# Patient Record
Sex: FEMALE | Race: WHITE | NOT HISPANIC OR LATINO | Employment: UNEMPLOYED | URBAN - METROPOLITAN AREA
[De-identification: names, ages, dates, MRNs, and addresses within clinical notes are randomized per-mention and may not be internally consistent; named-entity substitution may affect disease eponyms.]

---

## 2017-01-03 ENCOUNTER — TRANSCRIBE ORDERS (OUTPATIENT)
Dept: ADMINISTRATIVE | Facility: HOSPITAL | Age: 82
End: 2017-01-03

## 2017-01-03 ENCOUNTER — HOSPITAL ENCOUNTER (OUTPATIENT)
Dept: RADIOLOGY | Facility: HOSPITAL | Age: 82
Discharge: HOME | End: 2017-01-03
Attending: INTERNAL MEDICINE
Payer: MEDICARE

## 2017-01-03 DIAGNOSIS — R52 PAIN: Primary | ICD-10-CM

## 2017-01-03 DIAGNOSIS — M54.5 LOW BACK PAIN, UNSPECIFIED BACK PAIN LATERALITY, UNSPECIFIED CHRONICITY, WITH SCIATICA PRESENCE UNSPECIFIED: ICD-10-CM

## 2017-01-03 PROCEDURE — 72100 X-RAY EXAM L-S SPINE 2/3 VWS: CPT

## 2017-01-03 PROCEDURE — 73502 X-RAY EXAM HIP UNI 2-3 VIEWS: CPT

## 2017-01-03 PROCEDURE — 72220 X-RAY EXAM SACRUM TAILBONE: CPT

## 2017-03-03 ENCOUNTER — HOSPITAL ENCOUNTER (INPATIENT)
Facility: HOSPITAL | Age: 82
LOS: 4 days | Discharge: NURSING, REHAB OR LONG-TERM CARE | DRG: 605 | End: 2017-03-07
Attending: EMERGENCY MEDICINE | Admitting: INTERNAL MEDICINE
Payer: MEDICARE

## 2017-03-03 ENCOUNTER — APPOINTMENT (EMERGENCY)
Dept: RADIOLOGY | Facility: HOSPITAL | Age: 82
DRG: 605 | End: 2017-03-03
Payer: MEDICARE

## 2017-03-03 DIAGNOSIS — S80.00XA KNEE CONTUSION: Primary | ICD-10-CM

## 2017-03-03 DIAGNOSIS — R26.2 AMBULATORY DYSFUNCTION: ICD-10-CM

## 2017-03-03 PROBLEM — E87.6 HYPOKALEMIA: Status: ACTIVE | Noted: 2017-03-03

## 2017-03-03 PROBLEM — E87.0 HYPERNATREMIA: Status: ACTIVE | Noted: 2017-03-03

## 2017-03-03 PROBLEM — T14.8XXA HEMATOMA: Status: ACTIVE | Noted: 2017-03-03

## 2017-03-03 PROBLEM — M25.561 RIGHT KNEE PAIN: Status: ACTIVE | Noted: 2017-03-03

## 2017-03-03 LAB
ALBUMIN SERPL BCP-MCNC: 3.8 G/DL (ref 3.5–5)
ALP SERPL-CCNC: 124 U/L (ref 46–116)
ALT SERPL W P-5'-P-CCNC: 30 U/L (ref 12–78)
ANION GAP SERPL CALCULATED.3IONS-SCNC: 11 MMOL/L (ref 4–13)
APTT PPP: 21 SECONDS (ref 24–33)
AST SERPL W P-5'-P-CCNC: 20 U/L (ref 5–45)
BASOPHILS # BLD AUTO: 0 THOUSANDS/ΜL (ref 0–0.1)
BASOPHILS NFR BLD AUTO: 0 % (ref 0–1)
BILIRUB SERPL-MCNC: 0.6 MG/DL (ref 0.2–1)
BUN SERPL-MCNC: 15 MG/DL (ref 5–25)
CALCIUM SERPL-MCNC: 8.7 MG/DL (ref 8.3–10.1)
CHLORIDE SERPL-SCNC: 107 MMOL/L (ref 100–108)
CO2 SERPL-SCNC: 29 MMOL/L (ref 21–32)
CREAT SERPL-MCNC: 0.69 MG/DL (ref 0.6–1.3)
EOSINOPHIL # BLD AUTO: 0.1 THOUSAND/ΜL (ref 0–0.61)
EOSINOPHIL NFR BLD AUTO: 1 % (ref 0–6)
ERYTHROCYTE [DISTWIDTH] IN BLOOD BY AUTOMATED COUNT: 14.8 % (ref 11.6–15.1)
GFR SERPL CREATININE-BSD FRML MDRD: >60 ML/MIN/1.73SQ M
GLUCOSE SERPL-MCNC: 85 MG/DL (ref 65–140)
HCT VFR BLD AUTO: 41.1 % (ref 37–47)
HGB BLD-MCNC: 13.6 G/DL (ref 12–16)
INR PPP: 1.03 (ref 0.86–1.16)
LYMPHOCYTES # BLD AUTO: 1.3 THOUSANDS/ΜL (ref 0.6–4.47)
LYMPHOCYTES NFR BLD AUTO: 22 % (ref 14–44)
MCH RBC QN AUTO: 31.5 PG (ref 27–31)
MCHC RBC AUTO-ENTMCNC: 33.2 G/DL (ref 31.4–37.4)
MCV RBC AUTO: 95 FL (ref 82–98)
MONOCYTES # BLD AUTO: 0.4 THOUSAND/ΜL (ref 0.17–1.22)
MONOCYTES NFR BLD AUTO: 6 % (ref 4–12)
NEUTROPHILS # BLD AUTO: 4.1 THOUSANDS/ΜL (ref 1.85–7.62)
NEUTS SEG NFR BLD AUTO: 71 % (ref 43–75)
NRBC BLD AUTO-RTO: 0 /100 WBCS
PLATELET # BLD AUTO: 150 THOUSANDS/UL (ref 130–400)
PMV BLD AUTO: 8.6 FL (ref 8.9–12.7)
POTASSIUM SERPL-SCNC: 3.4 MMOL/L (ref 3.5–5.3)
PROT SERPL-MCNC: 6.6 G/DL (ref 6.4–8.2)
PROTHROMBIN TIME: 10.8 SECONDS (ref 9.4–11.7)
RBC # BLD AUTO: 4.33 MILLION/UL (ref 4.2–5.4)
SODIUM SERPL-SCNC: 147 MMOL/L (ref 136–145)
WBC # BLD AUTO: 5.8 THOUSAND/UL (ref 4.8–10.8)

## 2017-03-03 PROCEDURE — 99285 EMERGENCY DEPT VISIT HI MDM: CPT

## 2017-03-03 PROCEDURE — 80053 COMPREHEN METABOLIC PANEL: CPT | Performed by: EMERGENCY MEDICINE

## 2017-03-03 PROCEDURE — 36415 COLL VENOUS BLD VENIPUNCTURE: CPT | Performed by: EMERGENCY MEDICINE

## 2017-03-03 PROCEDURE — 85610 PROTHROMBIN TIME: CPT | Performed by: EMERGENCY MEDICINE

## 2017-03-03 PROCEDURE — 85025 COMPLETE CBC W/AUTO DIFF WBC: CPT | Performed by: EMERGENCY MEDICINE

## 2017-03-03 PROCEDURE — 96374 THER/PROPH/DIAG INJ IV PUSH: CPT

## 2017-03-03 PROCEDURE — 73560 X-RAY EXAM OF KNEE 1 OR 2: CPT

## 2017-03-03 PROCEDURE — 70450 CT HEAD/BRAIN W/O DYE: CPT

## 2017-03-03 PROCEDURE — 85730 THROMBOPLASTIN TIME PARTIAL: CPT | Performed by: EMERGENCY MEDICINE

## 2017-03-03 PROCEDURE — 96375 TX/PRO/DX INJ NEW DRUG ADDON: CPT

## 2017-03-03 RX ORDER — ACETAMINOPHEN 325 MG/1
650 TABLET ORAL EVERY 6 HOURS PRN
Status: DISCONTINUED | OUTPATIENT
Start: 2017-03-03 | End: 2017-03-08 | Stop reason: HOSPADM

## 2017-03-03 RX ORDER — MORPHINE SULFATE 2 MG/ML
2 INJECTION, SOLUTION INTRAMUSCULAR; INTRAVENOUS ONCE
Status: COMPLETED | OUTPATIENT
Start: 2017-03-03 | End: 2017-03-03

## 2017-03-03 RX ORDER — LISINOPRIL 10 MG/1
10 TABLET ORAL DAILY
Status: DISCONTINUED | OUTPATIENT
Start: 2017-03-04 | End: 2017-03-04

## 2017-03-03 RX ORDER — ATORVASTATIN CALCIUM 40 MG/1
40 TABLET, FILM COATED ORAL
Status: DISCONTINUED | OUTPATIENT
Start: 2017-03-03 | End: 2017-03-08 | Stop reason: HOSPADM

## 2017-03-03 RX ORDER — DEXTROSE AND POTASSIUM CHLORIDE 5; .15 G/100ML; G/100ML
50 SOLUTION INTRAVENOUS CONTINUOUS
Status: DISCONTINUED | OUTPATIENT
Start: 2017-03-03 | End: 2017-03-04

## 2017-03-03 RX ORDER — ONDANSETRON 2 MG/ML
4 INJECTION INTRAMUSCULAR; INTRAVENOUS ONCE
Status: COMPLETED | OUTPATIENT
Start: 2017-03-03 | End: 2017-03-03

## 2017-03-03 RX ORDER — MORPHINE SULFATE 4 MG/ML
4 INJECTION, SOLUTION INTRAMUSCULAR; INTRAVENOUS ONCE
Status: DISCONTINUED | OUTPATIENT
Start: 2017-03-03 | End: 2017-03-03

## 2017-03-03 RX ORDER — MORPHINE SULFATE 2 MG/ML
2 INJECTION, SOLUTION INTRAMUSCULAR; INTRAVENOUS EVERY 4 HOURS PRN
Status: DISCONTINUED | OUTPATIENT
Start: 2017-03-03 | End: 2017-03-08 | Stop reason: HOSPADM

## 2017-03-03 RX ORDER — SIMVASTATIN 80 MG
80 TABLET ORAL
COMMUNITY

## 2017-03-03 RX ORDER — POLYETHYLENE GLYCOL 3350 17 G/17G
17 POWDER, FOR SOLUTION ORAL EVERY OTHER DAY
Status: DISCONTINUED | OUTPATIENT
Start: 2017-03-03 | End: 2017-03-03

## 2017-03-03 RX ORDER — POLYETHYLENE GLYCOL 3350 17 G/17G
17 POWDER, FOR SOLUTION ORAL EVERY OTHER DAY
Status: DISCONTINUED | OUTPATIENT
Start: 2017-03-03 | End: 2017-03-08 | Stop reason: HOSPADM

## 2017-03-03 RX ORDER — PHENOBARBITAL 32.4 MG/1
32.4 TABLET ORAL
Status: DISCONTINUED | OUTPATIENT
Start: 2017-03-04 | End: 2017-03-08 | Stop reason: HOSPADM

## 2017-03-03 RX ORDER — ONDANSETRON 2 MG/ML
4 INJECTION INTRAMUSCULAR; INTRAVENOUS EVERY 6 HOURS PRN
Status: DISCONTINUED | OUTPATIENT
Start: 2017-03-03 | End: 2017-03-08 | Stop reason: HOSPADM

## 2017-03-03 RX ORDER — PHENOBARBITAL 32.4 MG/1
64.8 TABLET ORAL
Status: DISCONTINUED | OUTPATIENT
Start: 2017-03-03 | End: 2017-03-08 | Stop reason: HOSPADM

## 2017-03-03 RX ADMIN — METOPROLOL TARTRATE 25 MG: 25 TABLET ORAL at 20:47

## 2017-03-03 RX ADMIN — POLYETHYLENE GLYCOL 3350 17 G: 17 POWDER, FOR SOLUTION ORAL at 20:47

## 2017-03-03 RX ADMIN — DEXTROSE AND POTASSIUM CHLORIDE 50 ML/HR: 5; .15 SOLUTION INTRAVENOUS at 20:47

## 2017-03-03 RX ADMIN — ACETAMINOPHEN 650 MG: 325 TABLET, FILM COATED ORAL at 18:24

## 2017-03-03 RX ADMIN — ATORVASTATIN CALCIUM 40 MG: 40 TABLET, FILM COATED ORAL at 20:47

## 2017-03-03 RX ADMIN — ONDANSETRON 4 MG: 2 INJECTION INTRAMUSCULAR; INTRAVENOUS at 13:17

## 2017-03-03 RX ADMIN — MORPHINE SULFATE 2 MG: 2 INJECTION, SOLUTION INTRAMUSCULAR; INTRAVENOUS at 13:17

## 2017-03-03 RX ADMIN — PHENOBARBITAL 64.8 MG: 32.4 TABLET ORAL at 21:05

## 2017-03-04 ENCOUNTER — APPOINTMENT (INPATIENT)
Dept: PHYSICAL THERAPY | Facility: HOSPITAL | Age: 82
DRG: 605 | End: 2017-03-04
Payer: MEDICARE

## 2017-03-04 LAB
ALBUMIN SERPL BCP-MCNC: 2.7 G/DL (ref 3.5–5)
ALP SERPL-CCNC: 88 U/L (ref 46–116)
ALT SERPL W P-5'-P-CCNC: 22 U/L (ref 12–78)
ANION GAP SERPL CALCULATED.3IONS-SCNC: 5 MMOL/L (ref 4–13)
AST SERPL W P-5'-P-CCNC: 15 U/L (ref 5–45)
BASOPHILS # BLD AUTO: 0 THOUSANDS/ΜL (ref 0–0.1)
BASOPHILS NFR BLD AUTO: 0 % (ref 0–1)
BILIRUB SERPL-MCNC: 0.4 MG/DL (ref 0.2–1)
BUN SERPL-MCNC: 20 MG/DL (ref 5–25)
CALCIUM SERPL-MCNC: 7.4 MG/DL (ref 8.3–10.1)
CHLORIDE SERPL-SCNC: 107 MMOL/L (ref 100–108)
CK SERPL-CCNC: 35 U/L (ref 26–192)
CO2 SERPL-SCNC: 30 MMOL/L (ref 21–32)
CREAT SERPL-MCNC: 0.79 MG/DL (ref 0.6–1.3)
EOSINOPHIL # BLD AUTO: 0.1 THOUSAND/ΜL (ref 0–0.61)
EOSINOPHIL NFR BLD AUTO: 1 % (ref 0–6)
ERYTHROCYTE [DISTWIDTH] IN BLOOD BY AUTOMATED COUNT: 14.4 % (ref 11.6–15.1)
GFR SERPL CREATININE-BSD FRML MDRD: >60 ML/MIN/1.73SQ M
GLUCOSE SERPL-MCNC: 112 MG/DL (ref 65–140)
HCT VFR BLD AUTO: 28.7 % (ref 37–47)
HGB BLD-MCNC: 9.5 G/DL (ref 12–16)
LACTATE SERPL-SCNC: 1.2 MMOL/L (ref 0.5–2)
LYMPHOCYTES # BLD AUTO: 2.2 THOUSANDS/ΜL (ref 0.6–4.47)
LYMPHOCYTES NFR BLD AUTO: 30 % (ref 14–44)
MAGNESIUM SERPL-MCNC: 1.9 MG/DL (ref 1.6–2.6)
MCH RBC QN AUTO: 31.3 PG (ref 27–31)
MCHC RBC AUTO-ENTMCNC: 33 G/DL (ref 31.4–37.4)
MCV RBC AUTO: 95 FL (ref 82–98)
MONOCYTES # BLD AUTO: 0.6 THOUSAND/ΜL (ref 0.17–1.22)
MONOCYTES NFR BLD AUTO: 8 % (ref 4–12)
NEUTROPHILS # BLD AUTO: 4.6 THOUSANDS/ΜL (ref 1.85–7.62)
NEUTS SEG NFR BLD AUTO: 62 % (ref 43–75)
NRBC BLD AUTO-RTO: 0 /100 WBCS
PHOSPHATE SERPL-MCNC: 3.6 MG/DL (ref 2.3–4.1)
PLATELET # BLD AUTO: 134 THOUSANDS/UL (ref 130–400)
PMV BLD AUTO: 9 FL (ref 8.9–12.7)
POTASSIUM SERPL-SCNC: 3.7 MMOL/L (ref 3.5–5.3)
PROT SERPL-MCNC: 4.8 G/DL (ref 6.4–8.2)
RBC # BLD AUTO: 3.02 MILLION/UL (ref 4.2–5.4)
SODIUM SERPL-SCNC: 142 MMOL/L (ref 136–145)
WBC # BLD AUTO: 7.4 THOUSAND/UL (ref 4.8–10.8)

## 2017-03-04 PROCEDURE — G8978 MOBILITY CURRENT STATUS: HCPCS

## 2017-03-04 PROCEDURE — 97110 THERAPEUTIC EXERCISES: CPT

## 2017-03-04 PROCEDURE — 97162 PT EVAL MOD COMPLEX 30 MIN: CPT

## 2017-03-04 PROCEDURE — 83735 ASSAY OF MAGNESIUM: CPT | Performed by: INTERNAL MEDICINE

## 2017-03-04 PROCEDURE — G8979 MOBILITY GOAL STATUS: HCPCS

## 2017-03-04 PROCEDURE — 97166 OT EVAL MOD COMPLEX 45 MIN: CPT

## 2017-03-04 PROCEDURE — G8987 SELF CARE CURRENT STATUS: HCPCS

## 2017-03-04 PROCEDURE — G8988 SELF CARE GOAL STATUS: HCPCS

## 2017-03-04 PROCEDURE — 84100 ASSAY OF PHOSPHORUS: CPT | Performed by: INTERNAL MEDICINE

## 2017-03-04 PROCEDURE — 83605 ASSAY OF LACTIC ACID: CPT | Performed by: INTERNAL MEDICINE

## 2017-03-04 PROCEDURE — 82550 ASSAY OF CK (CPK): CPT | Performed by: INTERNAL MEDICINE

## 2017-03-04 PROCEDURE — 85025 COMPLETE CBC W/AUTO DIFF WBC: CPT | Performed by: INTERNAL MEDICINE

## 2017-03-04 PROCEDURE — 80053 COMPREHEN METABOLIC PANEL: CPT | Performed by: INTERNAL MEDICINE

## 2017-03-04 RX ORDER — SODIUM CHLORIDE 450 MG/100ML
50 INJECTION, SOLUTION INTRAVENOUS CONTINUOUS
Status: DISCONTINUED | OUTPATIENT
Start: 2017-03-04 | End: 2017-03-05

## 2017-03-04 RX ADMIN — PHENOBARBITAL 64.8 MG: 32.4 TABLET ORAL at 23:12

## 2017-03-04 RX ADMIN — SODIUM CHLORIDE 50 ML/HR: 0.45 INJECTION, SOLUTION INTRAVENOUS at 12:31

## 2017-03-04 RX ADMIN — ATORVASTATIN CALCIUM 40 MG: 40 TABLET, FILM COATED ORAL at 18:47

## 2017-03-04 RX ADMIN — METOPROLOL TARTRATE 25 MG: 25 TABLET ORAL at 09:08

## 2017-03-04 RX ADMIN — METOPROLOL TARTRATE 25 MG: 25 TABLET ORAL at 23:11

## 2017-03-04 RX ADMIN — Medication 1 TABLET: at 09:08

## 2017-03-04 RX ADMIN — PHENOBARBITAL 32.4 MG: 32.4 TABLET ORAL at 05:28

## 2017-03-05 ENCOUNTER — APPOINTMENT (INPATIENT)
Dept: PHYSICAL THERAPY | Facility: HOSPITAL | Age: 82
DRG: 605 | End: 2017-03-05
Payer: MEDICARE

## 2017-03-05 LAB
ANION GAP SERPL CALCULATED.3IONS-SCNC: 5 MMOL/L (ref 4–13)
BASOPHILS # BLD AUTO: 0 THOUSANDS/ΜL (ref 0–0.1)
BASOPHILS NFR BLD AUTO: 0 % (ref 0–1)
BUN SERPL-MCNC: 22 MG/DL (ref 5–25)
CALCIUM SERPL-MCNC: 7.4 MG/DL (ref 8.3–10.1)
CHLORIDE SERPL-SCNC: 108 MMOL/L (ref 100–108)
CO2 SERPL-SCNC: 29 MMOL/L (ref 21–32)
CREAT SERPL-MCNC: 0.78 MG/DL (ref 0.6–1.3)
EOSINOPHIL # BLD AUTO: 0.1 THOUSAND/ΜL (ref 0–0.61)
EOSINOPHIL NFR BLD AUTO: 2 % (ref 0–6)
ERYTHROCYTE [DISTWIDTH] IN BLOOD BY AUTOMATED COUNT: 14.9 % (ref 11.6–15.1)
GFR SERPL CREATININE-BSD FRML MDRD: >60 ML/MIN/1.73SQ M
GLUCOSE SERPL-MCNC: 88 MG/DL (ref 65–140)
HCT VFR BLD AUTO: 28.5 % (ref 37–47)
HGB BLD-MCNC: 9.3 G/DL (ref 12–16)
LYMPHOCYTES # BLD AUTO: 1.7 THOUSANDS/ΜL (ref 0.6–4.47)
LYMPHOCYTES NFR BLD AUTO: 26 % (ref 14–44)
MAGNESIUM SERPL-MCNC: 2 MG/DL (ref 1.6–2.6)
MCH RBC QN AUTO: 31.3 PG (ref 27–31)
MCHC RBC AUTO-ENTMCNC: 32.8 G/DL (ref 31.4–37.4)
MCV RBC AUTO: 95 FL (ref 82–98)
MONOCYTES # BLD AUTO: 0.4 THOUSAND/ΜL (ref 0.17–1.22)
MONOCYTES NFR BLD AUTO: 6 % (ref 4–12)
NEUTROPHILS # BLD AUTO: 4.4 THOUSANDS/ΜL (ref 1.85–7.62)
NEUTS SEG NFR BLD AUTO: 66 % (ref 43–75)
NRBC BLD AUTO-RTO: 0 /100 WBCS
PLATELET # BLD AUTO: 119 THOUSANDS/UL (ref 130–400)
PMV BLD AUTO: 8.8 FL (ref 8.9–12.7)
POTASSIUM SERPL-SCNC: 4 MMOL/L (ref 3.5–5.3)
RBC # BLD AUTO: 2.99 MILLION/UL (ref 4.2–5.4)
SODIUM SERPL-SCNC: 142 MMOL/L (ref 136–145)
WBC # BLD AUTO: 6.7 THOUSAND/UL (ref 4.8–10.8)

## 2017-03-05 PROCEDURE — 83735 ASSAY OF MAGNESIUM: CPT | Performed by: INTERNAL MEDICINE

## 2017-03-05 PROCEDURE — 97110 THERAPEUTIC EXERCISES: CPT

## 2017-03-05 PROCEDURE — 85025 COMPLETE CBC W/AUTO DIFF WBC: CPT | Performed by: INTERNAL MEDICINE

## 2017-03-05 PROCEDURE — 80048 BASIC METABOLIC PNL TOTAL CA: CPT | Performed by: INTERNAL MEDICINE

## 2017-03-05 RX ADMIN — PHENOBARBITAL 64.8 MG: 32.4 TABLET ORAL at 21:52

## 2017-03-05 RX ADMIN — METOPROLOL TARTRATE 25 MG: 25 TABLET ORAL at 08:07

## 2017-03-05 RX ADMIN — Medication 1 TABLET: at 08:11

## 2017-03-05 RX ADMIN — SODIUM CHLORIDE 50 ML/HR: 0.45 INJECTION, SOLUTION INTRAVENOUS at 08:18

## 2017-03-05 RX ADMIN — ATORVASTATIN CALCIUM 40 MG: 40 TABLET, FILM COATED ORAL at 18:44

## 2017-03-05 RX ADMIN — PHENOBARBITAL 32.4 MG: 32.4 TABLET ORAL at 06:27

## 2017-03-05 RX ADMIN — POLYETHYLENE GLYCOL 3350 17 G: 17 POWDER, FOR SOLUTION ORAL at 08:11

## 2017-03-06 ENCOUNTER — APPOINTMENT (INPATIENT)
Dept: OCCUPATIONAL THERAPY | Facility: HOSPITAL | Age: 82
DRG: 605 | End: 2017-03-06
Payer: MEDICARE

## 2017-03-06 ENCOUNTER — APPOINTMENT (INPATIENT)
Dept: PHYSICAL THERAPY | Facility: HOSPITAL | Age: 82
DRG: 605 | End: 2017-03-06
Payer: MEDICARE

## 2017-03-06 LAB
ANION GAP SERPL CALCULATED.3IONS-SCNC: 5 MMOL/L (ref 4–13)
BASOPHILS # BLD AUTO: 0 THOUSANDS/ΜL (ref 0–0.1)
BASOPHILS NFR BLD AUTO: 1 % (ref 0–1)
BUN SERPL-MCNC: 19 MG/DL (ref 5–25)
CALCIUM SERPL-MCNC: 7.6 MG/DL (ref 8.3–10.1)
CHLORIDE SERPL-SCNC: 108 MMOL/L (ref 100–108)
CO2 SERPL-SCNC: 31 MMOL/L (ref 21–32)
CREAT SERPL-MCNC: 0.69 MG/DL (ref 0.6–1.3)
EOSINOPHIL # BLD AUTO: 0.2 THOUSAND/ΜL (ref 0–0.61)
EOSINOPHIL NFR BLD AUTO: 4 % (ref 0–6)
ERYTHROCYTE [DISTWIDTH] IN BLOOD BY AUTOMATED COUNT: 14.8 % (ref 11.6–15.1)
GFR SERPL CREATININE-BSD FRML MDRD: >60 ML/MIN/1.73SQ M
GLUCOSE SERPL-MCNC: 91 MG/DL (ref 65–140)
HCT VFR BLD AUTO: 25.6 % (ref 37–47)
HGB BLD-MCNC: 8.5 G/DL (ref 12–16)
LYMPHOCYTES # BLD AUTO: 1.8 THOUSANDS/ΜL (ref 0.6–4.47)
LYMPHOCYTES NFR BLD AUTO: 31 % (ref 14–44)
MCH RBC QN AUTO: 31.5 PG (ref 27–31)
MCHC RBC AUTO-ENTMCNC: 33.3 G/DL (ref 31.4–37.4)
MCV RBC AUTO: 95 FL (ref 82–98)
MONOCYTES # BLD AUTO: 0.5 THOUSAND/ΜL (ref 0.17–1.22)
MONOCYTES NFR BLD AUTO: 9 % (ref 4–12)
NEUTROPHILS # BLD AUTO: 3.2 THOUSANDS/ΜL (ref 1.85–7.62)
NEUTS SEG NFR BLD AUTO: 56 % (ref 43–75)
NRBC BLD AUTO-RTO: 0 /100 WBCS
PLATELET # BLD AUTO: 119 THOUSANDS/UL (ref 130–400)
PMV BLD AUTO: 8.8 FL (ref 8.9–12.7)
POTASSIUM SERPL-SCNC: 3.8 MMOL/L (ref 3.5–5.3)
RBC # BLD AUTO: 2.7 MILLION/UL (ref 4.2–5.4)
SODIUM SERPL-SCNC: 144 MMOL/L (ref 136–145)
WBC # BLD AUTO: 5.7 THOUSAND/UL (ref 4.8–10.8)

## 2017-03-06 PROCEDURE — 80048 BASIC METABOLIC PNL TOTAL CA: CPT | Performed by: INTERNAL MEDICINE

## 2017-03-06 PROCEDURE — 85025 COMPLETE CBC W/AUTO DIFF WBC: CPT | Performed by: INTERNAL MEDICINE

## 2017-03-06 PROCEDURE — 97535 SELF CARE MNGMENT TRAINING: CPT

## 2017-03-06 PROCEDURE — 97110 THERAPEUTIC EXERCISES: CPT

## 2017-03-06 RX ADMIN — METOPROLOL TARTRATE 12.5 MG: 25 TABLET ORAL at 16:36

## 2017-03-06 RX ADMIN — PHENOBARBITAL 32.4 MG: 32.4 TABLET ORAL at 06:06

## 2017-03-06 RX ADMIN — PHENOBARBITAL 64.8 MG: 32.4 TABLET ORAL at 22:39

## 2017-03-06 RX ADMIN — ATORVASTATIN CALCIUM 40 MG: 40 TABLET, FILM COATED ORAL at 16:28

## 2017-03-06 RX ADMIN — METOPROLOL TARTRATE 25 MG: 25 TABLET ORAL at 08:18

## 2017-03-06 RX ADMIN — Medication 1 TABLET: at 08:18

## 2017-03-07 ENCOUNTER — APPOINTMENT (INPATIENT)
Dept: PHYSICAL THERAPY | Facility: HOSPITAL | Age: 82
DRG: 605 | End: 2017-03-07
Payer: MEDICARE

## 2017-03-07 ENCOUNTER — APPOINTMENT (INPATIENT)
Dept: OCCUPATIONAL THERAPY | Facility: HOSPITAL | Age: 82
DRG: 605 | End: 2017-03-07
Payer: MEDICARE

## 2017-03-07 VITALS
HEIGHT: 64 IN | BODY MASS INDEX: 23.05 KG/M2 | DIASTOLIC BLOOD PRESSURE: 65 MMHG | SYSTOLIC BLOOD PRESSURE: 128 MMHG | OXYGEN SATURATION: 100 % | TEMPERATURE: 98.2 F | HEART RATE: 74 BPM | RESPIRATION RATE: 18 BRPM | WEIGHT: 135 LBS

## 2017-03-07 LAB
ANION GAP SERPL CALCULATED.3IONS-SCNC: 4 MMOL/L (ref 4–13)
BUN SERPL-MCNC: 19 MG/DL (ref 5–25)
CALCIUM SERPL-MCNC: 7.9 MG/DL (ref 8.3–10.1)
CHLORIDE SERPL-SCNC: 109 MMOL/L (ref 100–108)
CO2 SERPL-SCNC: 31 MMOL/L (ref 21–32)
CREAT SERPL-MCNC: 0.57 MG/DL (ref 0.6–1.3)
ERYTHROCYTE [DISTWIDTH] IN BLOOD BY AUTOMATED COUNT: 14.9 % (ref 11.6–15.1)
GFR SERPL CREATININE-BSD FRML MDRD: >60 ML/MIN/1.73SQ M
GLUCOSE SERPL-MCNC: 96 MG/DL (ref 65–140)
HCT VFR BLD AUTO: 27.2 % (ref 37–47)
HGB BLD-MCNC: 9 G/DL (ref 12–16)
MCH RBC QN AUTO: 31.9 PG (ref 27–31)
MCHC RBC AUTO-ENTMCNC: 33.1 G/DL (ref 31.4–37.4)
MCV RBC AUTO: 96 FL (ref 82–98)
PLATELET # BLD AUTO: 136 THOUSANDS/UL (ref 130–400)
PMV BLD AUTO: 9.1 FL (ref 8.9–12.7)
POTASSIUM SERPL-SCNC: 3.7 MMOL/L (ref 3.5–5.3)
RBC # BLD AUTO: 2.82 MILLION/UL (ref 4.2–5.4)
SODIUM SERPL-SCNC: 144 MMOL/L (ref 136–145)
WBC # BLD AUTO: 6.9 THOUSAND/UL (ref 4.8–10.8)

## 2017-03-07 PROCEDURE — 80048 BASIC METABOLIC PNL TOTAL CA: CPT | Performed by: NURSE PRACTITIONER

## 2017-03-07 PROCEDURE — 97110 THERAPEUTIC EXERCISES: CPT

## 2017-03-07 PROCEDURE — 97535 SELF CARE MNGMENT TRAINING: CPT

## 2017-03-07 PROCEDURE — 85027 COMPLETE CBC AUTOMATED: CPT | Performed by: NURSE PRACTITIONER

## 2017-03-07 RX ORDER — PHENOBARBITAL 32.4 MG/1
TABLET ORAL
Status: COMPLETED
Start: 2017-03-07 | End: 2017-03-07

## 2017-03-07 RX ORDER — ACETAMINOPHEN 325 MG/1
TABLET ORAL
Status: COMPLETED
Start: 2017-03-07 | End: 2017-03-07

## 2017-03-07 RX ADMIN — METOPROLOL TARTRATE 12.5 MG: 25 TABLET ORAL at 09:30

## 2017-03-07 RX ADMIN — METOPROLOL TARTRATE 12.5 MG: 25 TABLET ORAL at 16:08

## 2017-03-07 RX ADMIN — ACETAMINOPHEN 650 MG: 325 TABLET, FILM COATED ORAL at 06:02

## 2017-03-07 RX ADMIN — PHENOBARBITAL 32.4 MG: 32.4 TABLET ORAL at 05:25

## 2017-03-07 RX ADMIN — METOPROLOL TARTRATE 12.5 MG: 25 TABLET ORAL at 00:25

## 2017-03-07 RX ADMIN — ATORVASTATIN CALCIUM 40 MG: 40 TABLET, FILM COATED ORAL at 16:08

## 2017-03-07 RX ADMIN — Medication 1 TABLET: at 09:30

## 2017-03-07 RX ADMIN — POLYETHYLENE GLYCOL 3350 17 G: 17 POWDER, FOR SOLUTION ORAL at 09:30

## 2017-07-27 ENCOUNTER — APPOINTMENT (OUTPATIENT)
Dept: PHYSICAL THERAPY | Facility: CLINIC | Age: 82
End: 2017-07-27
Payer: MEDICARE

## 2017-07-27 PROCEDURE — G8978 MOBILITY CURRENT STATUS: HCPCS

## 2017-07-27 PROCEDURE — 97163 PT EVAL HIGH COMPLEX 45 MIN: CPT

## 2017-07-27 PROCEDURE — G8979 MOBILITY GOAL STATUS: HCPCS

## 2017-08-01 ENCOUNTER — APPOINTMENT (OUTPATIENT)
Dept: PHYSICAL THERAPY | Facility: CLINIC | Age: 82
End: 2017-08-01
Payer: MEDICARE

## 2017-08-01 PROCEDURE — 97110 THERAPEUTIC EXERCISES: CPT

## 2017-08-01 PROCEDURE — 97112 NEUROMUSCULAR REEDUCATION: CPT

## 2017-08-08 ENCOUNTER — APPOINTMENT (OUTPATIENT)
Dept: PHYSICAL THERAPY | Facility: CLINIC | Age: 82
End: 2017-08-08
Payer: MEDICARE

## 2017-08-08 PROCEDURE — 97112 NEUROMUSCULAR REEDUCATION: CPT

## 2017-08-10 ENCOUNTER — APPOINTMENT (OUTPATIENT)
Dept: PHYSICAL THERAPY | Facility: CLINIC | Age: 82
End: 2017-08-10
Payer: MEDICARE

## 2017-08-10 PROCEDURE — 97112 NEUROMUSCULAR REEDUCATION: CPT

## 2017-08-15 ENCOUNTER — APPOINTMENT (OUTPATIENT)
Dept: PHYSICAL THERAPY | Facility: CLINIC | Age: 82
End: 2017-08-15
Payer: MEDICARE

## 2017-08-15 PROCEDURE — 97110 THERAPEUTIC EXERCISES: CPT

## 2017-08-15 PROCEDURE — 97112 NEUROMUSCULAR REEDUCATION: CPT

## 2017-08-22 ENCOUNTER — APPOINTMENT (OUTPATIENT)
Dept: PHYSICAL THERAPY | Facility: CLINIC | Age: 82
End: 2017-08-22
Payer: MEDICARE

## 2017-08-22 PROCEDURE — 97110 THERAPEUTIC EXERCISES: CPT

## 2017-08-22 PROCEDURE — G8979 MOBILITY GOAL STATUS: HCPCS

## 2017-08-22 PROCEDURE — 97112 NEUROMUSCULAR REEDUCATION: CPT

## 2017-08-22 PROCEDURE — G8978 MOBILITY CURRENT STATUS: HCPCS

## 2017-08-24 ENCOUNTER — APPOINTMENT (OUTPATIENT)
Dept: PHYSICAL THERAPY | Facility: CLINIC | Age: 82
End: 2017-08-24
Payer: MEDICARE

## 2017-08-24 PROCEDURE — 97110 THERAPEUTIC EXERCISES: CPT

## 2017-08-24 PROCEDURE — 97112 NEUROMUSCULAR REEDUCATION: CPT

## 2017-08-29 ENCOUNTER — APPOINTMENT (OUTPATIENT)
Dept: PHYSICAL THERAPY | Facility: CLINIC | Age: 82
End: 2017-08-29
Payer: MEDICARE

## 2017-08-29 PROCEDURE — 97112 NEUROMUSCULAR REEDUCATION: CPT

## 2017-08-29 PROCEDURE — 97110 THERAPEUTIC EXERCISES: CPT

## 2017-09-12 ENCOUNTER — APPOINTMENT (OUTPATIENT)
Dept: PHYSICAL THERAPY | Facility: CLINIC | Age: 82
End: 2017-09-12
Payer: MEDICARE

## 2017-09-12 PROCEDURE — 97110 THERAPEUTIC EXERCISES: CPT

## 2017-09-12 PROCEDURE — 97112 NEUROMUSCULAR REEDUCATION: CPT

## 2017-09-19 ENCOUNTER — APPOINTMENT (OUTPATIENT)
Dept: PHYSICAL THERAPY | Facility: CLINIC | Age: 82
End: 2017-09-19
Payer: MEDICARE

## 2017-09-19 PROCEDURE — 97112 NEUROMUSCULAR REEDUCATION: CPT

## 2017-09-19 PROCEDURE — G8980 MOBILITY D/C STATUS: HCPCS

## 2017-09-19 PROCEDURE — G8979 MOBILITY GOAL STATUS: HCPCS

## 2017-09-19 PROCEDURE — 97110 THERAPEUTIC EXERCISES: CPT

## 2017-09-26 ENCOUNTER — APPOINTMENT (OUTPATIENT)
Dept: PHYSICAL THERAPY | Facility: CLINIC | Age: 82
End: 2017-09-26
Payer: MEDICARE

## 2018-09-06 ENCOUNTER — HOSPITAL ENCOUNTER (EMERGENCY)
Facility: HOSPITAL | Age: 83
Discharge: HOME | End: 2018-09-06
Attending: EMERGENCY MEDICINE
Payer: MEDICARE

## 2018-09-06 ENCOUNTER — APPOINTMENT (EMERGENCY)
Dept: RADIOLOGY | Facility: HOSPITAL | Age: 83
End: 2018-09-06
Payer: MEDICARE

## 2018-09-06 VITALS
RESPIRATION RATE: 18 BRPM | HEART RATE: 58 BPM | WEIGHT: 145 LBS | DIASTOLIC BLOOD PRESSURE: 98 MMHG | BODY MASS INDEX: 24.89 KG/M2 | TEMPERATURE: 98.8 F | OXYGEN SATURATION: 98 % | SYSTOLIC BLOOD PRESSURE: 154 MMHG

## 2018-09-06 DIAGNOSIS — M25.559 HIP PAIN: Primary | ICD-10-CM

## 2018-09-06 PROCEDURE — 73502 X-RAY EXAM HIP UNI 2-3 VIEWS: CPT

## 2018-09-06 PROCEDURE — 99283 EMERGENCY DEPT VISIT LOW MDM: CPT

## 2018-09-06 RX ORDER — ACETAMINOPHEN 500 MG
500 TABLET ORAL EVERY 6 HOURS PRN
Qty: 30 TABLET | Refills: 0 | Status: SHIPPED | OUTPATIENT
Start: 2018-09-06

## 2018-09-06 RX ORDER — ACETAMINOPHEN 325 MG/1
650 TABLET ORAL ONCE
Status: COMPLETED | OUTPATIENT
Start: 2018-09-06 | End: 2018-09-06

## 2018-09-06 RX ADMIN — ACETAMINOPHEN 650 MG: 325 TABLET, FILM COATED ORAL at 12:40

## 2018-09-06 NOTE — ED PROVIDER NOTES
History  Chief Complaint   Patient presents with    Hip Pain     pt presents to the ed with right hip pain x2 days  pt states that she did not fall and the pain had gradually  worsened        History provided by:  Patient   used: No    Hip Pain   Location:  Right buttocks/hip  Quality:  Pain  Severity:  Mild  Onset quality:  Gradual  Duration:  2 days  Timing:  Intermittent  Progression:  Partially resolved  Chronicity:  New  Context:  No fall  Relieved by:  Rest  Worsened by: Movement  Ineffective treatments:  None  Associated symptoms: no abdominal pain, no fever, no loss of consciousness, no myalgias, no shortness of breath and no wheezing        Prior to Admission Medications   Prescriptions Last Dose Informant Patient Reported? Taking? Calcium-Vitamin D (CALTRATE 600 PLUS-VIT D PO)   Yes No   Sig: Take 1 tablet by mouth daily  PHENobarbital 32 4 mg tablet   Yes No   Sig: Take 32 4 mg by mouth daily in the early morning  PHENobarbital 64 8 mg tablet   Yes No   Sig: Take 64 8 mg by mouth daily at bedtime  aspirin 81 MG tablet   Yes No   Sig: Take 81 mg by mouth daily   metoprolol tartrate (LOPRESSOR) 25 mg tablet   No No   Sig: Take 0 5 tablets by mouth every 8 (eight) hours for 30 days   multivitamin-iron-minerals-folic acid (CENTRUM) chewable tablet   Yes No   Sig: Chew 1 tablet daily  polyethylene glycol (GLYCOLAX) powder  Self Yes No   Sig: Take 17 g by mouth every other day Indications: Treatment for the Prevention of Constipation, pt states this medication is taken every other day to avoid constipation cauased by other medications       simvastatin (ZOCOR) 80 mg tablet   Yes No   Sig: Take 80 mg by mouth daily at bedtime      Facility-Administered Medications: None       Past Medical History:   Diagnosis Date    Cardiac disease     Hyperlipidemia     Hypertension     Myocardial infarct ([facility] [address] )     TIA (transient ischemic attack)     pt states she was told she had a ministroke in February of this year (2016)       Past Surgical History:   Procedure Laterality Date    APPENDECTOMY      BRAIN SURGERY      tumor removal    CARDIAC SURGERY      stent placement    ORIF FEMUR FRACTURE Right 8/14/2016    Procedure: OPEN REDUCTION W/ INTERNAL FIXATION (ORIF) FEMUR;  Surgeon: Joanne Leblanc MD;  [facility];  Service:        Family History   Problem Relation Age of Onset    Mental illness Mother     Cancer Father     Cancer Daughter      I have reviewed and agree with the history as documented  Social History   Substance Use Topics    Smoking status: Never Smoker    Smokeless tobacco: Never Used    Alcohol use 0 6 oz/week     1 Glasses of wine per week      Comment: occasionally        Review of Systems   Constitutional: Negative for activity change, appetite change and fever  Respiratory: Negative for shortness of breath and wheezing  Gastrointestinal: Negative for abdominal pain  Genitourinary: Negative for difficulty urinating, dyspareunia and dysuria  Musculoskeletal: Positive for gait problem  Negative for arthralgias, back pain, joint swelling, myalgias, neck pain and neck stiffness  Neurological: Negative for loss of consciousness  All other systems reviewed and are negative  Physical Exam  Physical Exam   Constitutional: She is oriented to person, place, and time  She appears well-developed and well-nourished  No distress  HENT:   Head: Normocephalic and atraumatic  Eyes: EOM are normal  Pupils are equal, round, and reactive to light  Neck: Normal range of motion  Neck supple  Cardiovascular: Normal rate, regular rhythm and normal heart sounds  No murmur heard  Pulmonary/Chest: Effort normal and breath sounds normal  No respiratory distress  She has no wheezes  She has no rales  Abdominal: Soft  Bowel sounds are normal  She exhibits no distension  There is no tenderness  There is no rebound and no guarding  Musculoskeletal: Normal range of motion  She exhibits no edema or deformity  Legs:  Lymphadenopathy:     She has no cervical adenopathy  Neurological: She is alert and oriented to person, place, and time  No cranial nerve deficit  She exhibits normal muscle tone  Coordination normal    Skin: Capillary refill takes less than 2 seconds  No rash noted  No erythema  Psychiatric: She has a normal mood and affect  Her behavior is normal    Nursing note and vitals reviewed  Vital Signs  ED Triage Vitals [09/06/18 1157]   Temperature Pulse Respirations Blood Pressure SpO2   98 8 °F (37 1 °C) 58 18 154/98 98 %      Temp Source Heart Rate Source Patient Position - Orthostatic VS BP Location FiO2 (%)   Tympanic Monitor -- -- --      Pain Score       --           Vitals:    09/06/18 1157   BP: 154/98   Pulse: 58       Visual Acuity      ED Medications  Medications   acetaminophen (TYLENOL) tablet 650 mg (650 mg Oral Given 9/6/18 1240)       Diagnostic Studies  Results Reviewed     None                 XR hip/pelv 2-3 vws right if performed   Final Result by Hemal Smith MD (09/06 1241)      Right hip ORIF with no acute fracture or hardware failure  Workstation performed: [id number]                    Procedures  Procedures       Phone Contacts  ED Phone Contact    ED Course                               MDM  Number of Diagnoses or Management Options  Hip pain: new and requires workup  Diagnosis management comments: Patient is a 71-year-old female with history of right femur fracture, status post internal fixation in 2016 who presents via EMS for evaluation of right hip pain for the past 2 days  Not taking any medications at home for pain  Denies any fall  Denies any fever, chills  Has been able to ambulate on her hip but does have pain  Denies any back pain, bowel or bladder incontinence, urinary symptoms  Vital signs stable  Patient overall appears well  Full range of motion right hip  No bony tenderness of lower lumbar spine or hip  No redness, rash, warmth to suggest septic joint  Denies any urinary symptoms  X-ray with intact hardware, no acute fracture malalignment  Patient given Tylenol in ED for pain, improvement of her symptoms  No numbness, weakness, tingling  Patient continued with full range of motion of hip, able to bear weight and discharged home with instructions to use Tylenol every 4-6 hours, heating pad for comfort  Recommended PCP follow-up early next week for re-evaluation  Strict return precautions given to patient  Amount and/or Complexity of Data Reviewed  Tests in the radiology section of CPT®: ordered and reviewed  Independent visualization of images, tracings, or specimens: yes    Risk of Complications, Morbidity, and/or Mortality  Presenting problems: moderate  Diagnostic procedures: moderate  Management options: moderate    Patient Progress  Patient progress: stable    CritCare Time    Disposition  Final diagnoses:   Hip pain     Time reflects when diagnosis was documented in both MDM as applicable and the Disposition within this note     Time User Action Codes Description Comment    9/6/2018 12:45 PM Gabi Ivy Add [K10 [id number]] Hip pain       ED Disposition     ED Disposition Condition Comment    Discharge  Luc Phelan discharge to home/self care      Condition at discharge: Good        Follow-up Information     Follow up With Specialties Details Why Contact Info    Karla Tobin MD Internal Medicine Schedule an appointment as soon as possible for a visit in 3 days As needed, If symptoms worsen [address] [address] [phone]            Patient's Medications   Discharge Prescriptions    ACETAMINOPHEN (TYLENOL) 500 MG TABLET    Take 1 tablet (500 mg total) by mouth every 6 (six) hours as needed for mild pain or moderate pain       Start Date: 9/6/2018  End Date: --       Order Dose: 500 mg       Quantity: 30 tablet Refills: 0     No discharge procedures on file      ED Provider  Electronically Signed by           Fernando Jordan MD  09/06/18 7813

## 2018-09-06 NOTE — DISCHARGE INSTRUCTIONS
Hip Pain   WHAT YOU NEED TO KNOW:   Hip pain can be caused by a number of conditions, such as bursitis, arthritis, or muscle or tendon strain  X-rays do not show broken bones  You may have swelling in the fluid-filled sacs that protect your muscles and tendons  Hip pain can also be caused by a lower back problem  Hip pain may be caused by trauma, playing sports, or running  Your pain may start in your hip and go to your thigh, buttock, or groin  DISCHARGE INSTRUCTIONS:   Medicines:   · NSAIDs , such as ibuprofen, help decrease swelling, pain, and fever  This medicine is available with or without a doctor's order  NSAIDs can cause stomach bleeding or kidney problems in certain people  If you take blood thinner medicine, always ask your healthcare provider if NSAIDs are safe for you  Always read the medicine label and follow directions  · Take your medicine as directed  Contact your healthcare provider if you think your medicine is not helping or if you have side effects  Tell him of her if you are allergic to any medicine  Keep a list of the medicines, vitamins, and herbs you take  Include the amounts, and when and why you take them  Bring the list or the pill bottles to follow-up visits  Carry your medicine list with you in case of an emergency  Return to the emergency department if:   · Your pain gets worse  · You have numbness in your leg or toes  · You cannot put any weight on or move your hip  Contact your healthcare provider if:   · You have a fever  · Your pain does not decrease, even after treatment  · You have questions or concerns about your condition or care  Follow up with your healthcare provider as directed: You may need physical therapy, an injection, or more testing  You may need to see an orthopedic specialist  Write down your questions so you remember to ask them during your visits    Manage your hip pain:   · Rest  your injured hip so that it can heal  You may need to avoid putting any weight on your hip for at least 48 hours  Return to normal activities as directed  · Ice  the injury for 20 minutes every 4 hours, or as directed  Use an ice pack, or put crushed ice in a plastic bag  Cover it with a towel to protect your skin  Ice helps prevent tissue damage and decreases swelling and pain  · Elevate  your injured hip above the level of your heart as often as you can  This will help decrease swelling and pain  If possible, prop your hip and leg on pillows or blankets to keep the area elevated comfortably  · Maintain a healthy weight  Extra body weight can cause pressure and pain in your hip, knee, and ankle joints  Ask your healthcare provider how much you should weigh  Ask him to help you create a weight loss plan if you are overweight  · Use assistive devices as directed  You may need to use a cane or crutches  Assistive devices help decrease pain and pressure on your hip when you walk  Ask your healthcare provider for more information about assistive devices and how to use them correctly  © 2017 [address] [facility] Information is for End User's use only and may not be sold, redistributed or otherwise used for commercial purposes  All illustrations and images included in CareNotes® are the copyrighted property of [employer]  or [employer]  The above information is an  only  It is not intended as medical advice for individual conditions or treatments  Talk to your doctor, nurse or pharmacist before following any medical regimen to see if it is safe and effective for you